# Patient Record
Sex: MALE | Race: WHITE | Employment: FULL TIME | URBAN - METROPOLITAN AREA
[De-identification: names, ages, dates, MRNs, and addresses within clinical notes are randomized per-mention and may not be internally consistent; named-entity substitution may affect disease eponyms.]

---

## 2018-03-02 ENCOUNTER — OFFICE VISIT (OUTPATIENT)
Dept: FAMILY MEDICINE CLINIC | Facility: CLINIC | Age: 23
End: 2018-03-02

## 2018-03-02 VITALS
SYSTOLIC BLOOD PRESSURE: 120 MMHG | RESPIRATION RATE: 16 BRPM | DIASTOLIC BLOOD PRESSURE: 78 MMHG | BODY MASS INDEX: 37 KG/M2 | HEART RATE: 77 BPM | TEMPERATURE: 99 F | OXYGEN SATURATION: 100 % | WEIGHT: 264 LBS

## 2018-03-02 DIAGNOSIS — Z87.898 HISTORY OF NAUSEA AND VOMITING: Primary | ICD-10-CM

## 2018-03-02 PROCEDURE — 99213 OFFICE O/P EST LOW 20 MIN: CPT | Performed by: PHYSICIAN ASSISTANT

## 2018-03-02 NOTE — PROGRESS NOTES
CHIEF COMPLAINT:   Patient presents with:  Vomiting: states thinks it was something he ate, missed 3 days of work, feeling better to go back      HPI:   Avinash Middleton is a 25year old male who presents for complaints of stomach upset and previous episo GENERAL: alert and oriented x 3, no acute distress  SKIN: no rashes or suspicious lesions  HEAD: atraumatic, normocephalic,   EYES: conjunctiva clear, EOMs intact  NOSE: nostrils patent. Nasal mucosa pink and without exudates.    THROAT: oral mucosa pink,

## (undated) NOTE — LETTER
Date: 3/2/2018    Patient Name: Veto Lundborg          To Whom it may concern: This letter has been written at the patient's request. The above patient was seen at the [facility] and the [facility] for treatment of a medical condition today.     The patient